# Patient Record
Sex: FEMALE | Race: WHITE | Employment: FULL TIME | URBAN - METROPOLITAN AREA
[De-identification: names, ages, dates, MRNs, and addresses within clinical notes are randomized per-mention and may not be internally consistent; named-entity substitution may affect disease eponyms.]

---

## 2022-02-03 ENCOUNTER — TRANSCRIBE ORDER (OUTPATIENT)
Dept: SCHEDULING | Age: 52
End: 2022-02-03

## 2022-02-03 DIAGNOSIS — Z12.31 ENCOUNTER FOR SCREENING MAMMOGRAM FOR MALIGNANT NEOPLASM OF BREAST: Primary | ICD-10-CM

## 2022-02-04 ENCOUNTER — HOSPITAL ENCOUNTER (OUTPATIENT)
Dept: MAMMOGRAPHY | Age: 52
Discharge: HOME | End: 2022-02-04
Attending: FAMILY MEDICINE

## 2022-02-04 DIAGNOSIS — Z12.31 ENCOUNTER FOR SCREENING MAMMOGRAM FOR MALIGNANT NEOPLASM OF BREAST: ICD-10-CM

## 2022-02-04 PROCEDURE — 77063 BREAST TOMOSYNTHESIS BI: CPT

## 2023-11-22 ENCOUNTER — TRANSCRIBE ORDERS (OUTPATIENT)
Dept: SCHEDULING | Age: 53
End: 2023-11-22

## 2023-11-22 DIAGNOSIS — Z12.31 SCREENING MAMMOGRAM FOR HIGH-RISK PATIENT: Primary | ICD-10-CM

## 2023-11-29 ENCOUNTER — OFFICE VISIT (OUTPATIENT)
Dept: OBGYN CLINIC | Age: 53
End: 2023-11-29

## 2023-11-29 ENCOUNTER — HOSPITAL ENCOUNTER (OUTPATIENT)
Dept: LAB | Age: 53
Setting detail: SPECIMEN
Discharge: HOME | End: 2023-12-02

## 2023-11-29 VITALS
DIASTOLIC BLOOD PRESSURE: 72 MMHG | SYSTOLIC BLOOD PRESSURE: 106 MMHG | WEIGHT: 132.6 LBS | HEIGHT: 64 IN | BODY MASS INDEX: 22.64 KG/M2

## 2023-11-29 DIAGNOSIS — Z11.51 SCREENING FOR HUMAN PAPILLOMAVIRUS (HPV): ICD-10-CM

## 2023-11-29 DIAGNOSIS — Z12.4 PAP SMEAR FOR CERVICAL CANCER SCREENING: ICD-10-CM

## 2023-11-29 DIAGNOSIS — Z01.419 WELL WOMAN EXAM: Primary | ICD-10-CM

## 2023-11-29 DIAGNOSIS — N63.20 MASS OF LEFT BREAST, UNSPECIFIED QUADRANT: ICD-10-CM

## 2023-11-29 PROCEDURE — 88175 CYTOPATH C/V AUTO FLUID REDO: CPT

## 2023-11-29 PROCEDURE — 87624 HPV HI-RISK TYP POOLED RSLT: CPT

## 2023-11-29 PROCEDURE — 99386 PREV VISIT NEW AGE 40-64: CPT | Performed by: NURSE PRACTITIONER

## 2023-11-29 NOTE — PROGRESS NOTES
Patient presents today for a routine gynecological examination with no complaints. OB History          3    Para   2    Term   2            AB   1    Living             SAB        IAB        Ectopic        Molar        Multiple        Live Births                  Last pap smear: 22 (Negative HR HPV not performed)  Last mammogram: 22- left breast mass noted 3:00, 4 cm from the nipple. Additional imagining recommended. Never performed   Last colonoscopy: NA  Last DEXA: NA    GYN History         No LMP recorded. (Menstrual status: Menopause). Past Medical History:  History reviewed. No pertinent past medical history. Past Surgical History:  Past Surgical History:   Procedure Laterality Date    BREAST BIOPSY Right     BREAST BIOPSY Left 2019       Allergies:   No Known Allergies    Medication History:  No current outpatient medications on file. No current facility-administered medications for this visit. Social History:  Social History     Socioeconomic History    Marital status:      Spouse name: Not on file    Number of children: Not on file    Years of education: Not on file    Highest education level: Not on file   Occupational History    Not on file   Tobacco Use    Smoking status: Never    Smokeless tobacco: Never   Vaping Use    Vaping Use: Never used   Substance and Sexual Activity    Alcohol use: Yes    Drug use: Never    Sexual activity: Yes     Partners: Male   Other Topics Concern    Not on file   Social History Narrative    Denies any sexual or physical abuse and feels safe at home.       Social Determinants of Health     Financial Resource Strain: Not on file   Food Insecurity: Not on file   Transportation Needs: Not on file   Physical Activity: Not on file   Stress: Not on file   Social Connections: Not on file   Intimate Partner Violence: Not on file   Housing Stability: Not on file       Family History:  Family History   Problem Relation Age of Onset

## 2023-12-06 LAB
COLLECTION METHOD: NORMAL
CYTOLOGIST CVX/VAG CYTO: NORMAL
CYTOLOGY CVX/VAG DOC THIN PREP: NORMAL
DATE OF LMP: NORMAL
HPV APTIMA: NEGATIVE
Lab: NORMAL
PAP SOURCE: NORMAL
PATH REPORT.FINAL DX SPEC: NORMAL
PREV TREATMENT: NORMAL
STAT OF ADQ CVX/VAG CYTO-IMP: NORMAL

## 2023-12-22 ENCOUNTER — HOSPITAL ENCOUNTER (OUTPATIENT)
Dept: MAMMOGRAPHY | Age: 53
Discharge: HOME | End: 2023-12-25

## 2023-12-22 VITALS — BODY MASS INDEX: 22.53 KG/M2 | HEIGHT: 64 IN | WEIGHT: 132 LBS

## 2023-12-22 DIAGNOSIS — N63.20 MASS OF LEFT BREAST, UNSPECIFIED QUADRANT: ICD-10-CM

## 2023-12-22 PROCEDURE — 76642 ULTRASOUND BREAST LIMITED: CPT

## 2023-12-22 PROCEDURE — G0279 TOMOSYNTHESIS, MAMMO: HCPCS

## 2024-09-18 ENCOUNTER — OFFICE VISIT (OUTPATIENT)
Dept: OBGYN CLINIC | Age: 54
End: 2024-09-18

## 2024-09-18 VITALS
BODY MASS INDEX: 22.88 KG/M2 | DIASTOLIC BLOOD PRESSURE: 70 MMHG | SYSTOLIC BLOOD PRESSURE: 138 MMHG | WEIGHT: 134 LBS | HEIGHT: 64 IN

## 2024-09-18 DIAGNOSIS — Z13.29 SCREENING FOR ENDOCRINE, METABOLIC AND IMMUNITY DISORDER: ICD-10-CM

## 2024-09-18 DIAGNOSIS — Z13.0 SCREENING FOR ENDOCRINE, METABOLIC AND IMMUNITY DISORDER: ICD-10-CM

## 2024-09-18 DIAGNOSIS — Z13.228 SCREENING FOR ENDOCRINE, METABOLIC AND IMMUNITY DISORDER: ICD-10-CM

## 2024-09-18 DIAGNOSIS — N95.1 MENOPAUSE SYNDROME: Primary | ICD-10-CM

## 2024-09-18 RX ORDER — ESTRADIOL 0.04 MG/D
1 PATCH, EXTENDED RELEASE TRANSDERMAL
Qty: 8 PATCH | Refills: 2 | Status: SHIPPED | OUTPATIENT
Start: 2024-09-19

## 2024-09-18 RX ORDER — PROGESTERONE 100 MG/1
100 CAPSULE ORAL NIGHTLY
Qty: 30 CAPSULE | Refills: 2 | Status: SHIPPED | OUTPATIENT
Start: 2024-09-18

## 2024-09-21 ENCOUNTER — TELEPHONE (OUTPATIENT)
Dept: OBGYN CLINIC | Age: 54
End: 2024-09-21

## 2024-10-03 DIAGNOSIS — Z13.29 SCREENING FOR ENDOCRINE, METABOLIC AND IMMUNITY DISORDER: ICD-10-CM

## 2024-10-03 DIAGNOSIS — Z13.228 SCREENING FOR ENDOCRINE, METABOLIC AND IMMUNITY DISORDER: ICD-10-CM

## 2024-10-03 DIAGNOSIS — N95.1 MENOPAUSE SYNDROME: ICD-10-CM

## 2024-10-03 DIAGNOSIS — Z13.0 SCREENING FOR ENDOCRINE, METABOLIC AND IMMUNITY DISORDER: ICD-10-CM

## 2024-10-03 RX ORDER — ESTRADIOL 0.04 MG/D
PATCH, EXTENDED RELEASE TRANSDERMAL
Qty: 24 PATCH | Refills: 1 | OUTPATIENT
Start: 2024-10-03

## 2024-10-04 LAB
25(OH)D3 SERPL-MCNC: 34 NG/ML (ref 30–100)
ALBUMIN SERPL-MCNC: 4.2 G/DL (ref 3.5–5)
ALBUMIN/GLOB SERPL: 1.6 (ref 1–1.9)
ALP SERPL-CCNC: 64 U/L (ref 35–104)
ALT SERPL-CCNC: 27 U/L (ref 8–45)
ANION GAP SERPL CALC-SCNC: 14 MMOL/L (ref 9–18)
AST SERPL-CCNC: 29 U/L (ref 15–37)
BASOPHILS # BLD: 0.1 K/UL (ref 0–0.2)
BASOPHILS NFR BLD: 2 % (ref 0–2)
BILIRUB SERPL-MCNC: 0.3 MG/DL (ref 0–1.2)
BUN SERPL-MCNC: 19 MG/DL (ref 6–23)
CALCIUM SERPL-MCNC: 9.1 MG/DL (ref 8.8–10.2)
CHLORIDE SERPL-SCNC: 104 MMOL/L (ref 98–107)
CHOLEST SERPL-MCNC: 234 MG/DL (ref 0–200)
CO2 SERPL-SCNC: 25 MMOL/L (ref 20–28)
CREAT SERPL-MCNC: 0.8 MG/DL (ref 0.6–1.1)
DIFFERENTIAL METHOD BLD: ABNORMAL
EOSINOPHIL # BLD: 0.2 K/UL (ref 0–0.8)
EOSINOPHIL NFR BLD: 5 % (ref 0.5–7.8)
ERYTHROCYTE [DISTWIDTH] IN BLOOD BY AUTOMATED COUNT: 12.3 % (ref 11.9–14.6)
EST. AVERAGE GLUCOSE BLD GHB EST-MCNC: 111 MG/DL
GLOBULIN SER CALC-MCNC: 2.6 G/DL (ref 2.3–3.5)
GLUCOSE SERPL-MCNC: 82 MG/DL (ref 70–99)
HBA1C MFR BLD: 5.5 % (ref 0–5.6)
HCT VFR BLD AUTO: 45.7 % (ref 35.8–46.3)
HDLC SERPL-MCNC: 73 MG/DL (ref 40–60)
HDLC SERPL: 3.2 (ref 0–5)
HGB BLD-MCNC: 14.8 G/DL (ref 11.7–15.4)
IMM GRANULOCYTES # BLD AUTO: 0 K/UL (ref 0–0.5)
IMM GRANULOCYTES NFR BLD AUTO: 1 % (ref 0–5)
LDLC SERPL CALC-MCNC: 148 MG/DL (ref 0–100)
LYMPHOCYTES # BLD: 1.5 K/UL (ref 0.5–4.6)
LYMPHOCYTES NFR BLD: 41 % (ref 13–44)
MCH RBC QN AUTO: 31.6 PG (ref 26.1–32.9)
MCHC RBC AUTO-ENTMCNC: 32.4 G/DL (ref 31.4–35)
MCV RBC AUTO: 97.6 FL (ref 82–102)
MONOCYTES # BLD: 0.3 K/UL (ref 0.1–1.3)
MONOCYTES NFR BLD: 9 % (ref 4–12)
NEUTS SEG # BLD: 1.6 K/UL (ref 1.7–8.2)
NEUTS SEG NFR BLD: 43 % (ref 43–78)
NRBC # BLD: 0 K/UL (ref 0–0.2)
PLATELET # BLD AUTO: 210 K/UL (ref 150–450)
PMV BLD AUTO: 10.6 FL (ref 9.4–12.3)
POTASSIUM SERPL-SCNC: 4.4 MMOL/L (ref 3.5–5.1)
PROT SERPL-MCNC: 6.7 G/DL (ref 6.3–8.2)
RBC # BLD AUTO: 4.68 M/UL (ref 4.05–5.2)
SODIUM SERPL-SCNC: 143 MMOL/L (ref 136–145)
TRIGL SERPL-MCNC: 64 MG/DL (ref 0–150)
TSH W FREE THYROID IF ABNORMAL: 2.36 UIU/ML (ref 0.27–4.2)
VLDLC SERPL CALC-MCNC: 13 MG/DL (ref 6–23)
WBC # BLD AUTO: 3.7 K/UL (ref 4.3–11.1)

## 2024-10-08 ENCOUNTER — TELEPHONE (OUTPATIENT)
Dept: OBGYN CLINIC | Age: 54
End: 2024-10-08

## 2024-10-08 NOTE — TELEPHONE ENCOUNTER
Called patient reviewed blood work, CV risk, and recommendations with patient.  Patient verbalized understanding, patient states she still needs to call insurance about breast MRI, will let us know if it is covered so that we can put order in.     Patient has not started HRT yet but knows her CV risk as 1.5% puts her in a low risk category. Patient is just gauging if she really wants/needs HRT or not.   Patient denies questions at this time, patient confirms 11/14/24 f/u visit.     Routing back to provider as FYI.

## 2024-10-08 NOTE — TELEPHONE ENCOUNTER
Please let Karoline know her thyroid is normal. Vit D level in normal range (but lower end of normal - it as 34, normal is ). Normal Diabetes screening. Normal electrolytes, kidney and liver function. Not anemic.     Her total cholesterol, bad cholesterol () and good cholesterol (HDL 73) are all slightly high. Normal triglycerides.  Typically if the good and bad cholesterol are both a little high, they balance each other out. I'd still recommend low chol, low fat diet and exercise. Please have these results reviewed by your PCP as well. Her 10 year cardiovascular risk is low (1.5%). Typically we want this level to be below 5-10% to be on HRT which it is. Please let me know if she has questions.     Please let me know if she would like the breast MRI ordered.     Thanks, Dr Walker         The 10-year ASCVD risk score (Kellie PECK, et al., 2019) is: 1.5%    Values used to calculate the score:      Age: 54 years      Sex: Female      Is Non- : No      Diabetic: No      Tobacco smoker: No      Systolic Blood Pressure: 138 mmHg      Is BP treated: No      HDL Cholesterol: 73 MG/DL      Total Cholesterol: 234 MG/DL

## 2025-05-12 ENCOUNTER — HOSPITAL ENCOUNTER (OUTPATIENT)
Dept: MRI IMAGING | Age: 55
Discharge: HOME | End: 2025-05-15
Payer: COMMERCIAL

## 2025-05-12 DIAGNOSIS — Z91.89 OTHER SPECIFIED PERSONAL RISK FACTORS, NOT ELSEWHERE CLASSIFIED: ICD-10-CM

## 2025-05-12 PROCEDURE — 6360000004 HC RX CONTRAST MEDICATION: Performed by: STUDENT IN AN ORGANIZED HEALTH CARE EDUCATION/TRAINING PROGRAM

## 2025-05-12 PROCEDURE — A9579 GAD-BASE MR CONTRAST NOS,1ML: HCPCS | Performed by: STUDENT IN AN ORGANIZED HEALTH CARE EDUCATION/TRAINING PROGRAM

## 2025-05-12 PROCEDURE — C8908 MRI W/O FOL W/CONT, BREAST,: HCPCS

## 2025-05-12 RX ADMIN — GADOTERIDOL 12 ML: 279.3 INJECTION, SOLUTION INTRAVENOUS at 09:03

## 2025-09-02 ENCOUNTER — TRANSCRIBE ORDERS (OUTPATIENT)
Dept: SCHEDULING | Age: 55
End: 2025-09-02

## 2025-09-02 DIAGNOSIS — N95.0 POSTMENOPAUSE BLEEDING: Primary | ICD-10-CM
